# Patient Record
Sex: FEMALE | Race: OTHER | HISPANIC OR LATINO
[De-identification: names, ages, dates, MRNs, and addresses within clinical notes are randomized per-mention and may not be internally consistent; named-entity substitution may affect disease eponyms.]

---

## 2021-01-20 ENCOUNTER — APPOINTMENT (OUTPATIENT)
Dept: ANTEPARTUM | Facility: CLINIC | Age: 21
End: 2021-01-20
Payer: MEDICAID

## 2021-01-20 ENCOUNTER — TRANSCRIPTION ENCOUNTER (OUTPATIENT)
Age: 21
End: 2021-01-20

## 2021-01-20 ENCOUNTER — ASOB RESULT (OUTPATIENT)
Age: 21
End: 2021-01-20

## 2021-01-20 ENCOUNTER — INPATIENT (INPATIENT)
Facility: HOSPITAL | Age: 21
LOS: 2 days | Discharge: HOME | End: 2021-01-23
Attending: OBSTETRICS & GYNECOLOGY | Admitting: OBSTETRICS & GYNECOLOGY
Payer: MEDICAID

## 2021-01-20 VITALS
TEMPERATURE: 99 F | RESPIRATION RATE: 14 BRPM | SYSTOLIC BLOOD PRESSURE: 117 MMHG | HEART RATE: 106 BPM | DIASTOLIC BLOOD PRESSURE: 76 MMHG

## 2021-01-20 DIAGNOSIS — Z3A.39 39 WEEKS GESTATION OF PREGNANCY: ICD-10-CM

## 2021-01-20 PROBLEM — Z00.00 ENCOUNTER FOR PREVENTIVE HEALTH EXAMINATION: Status: ACTIVE | Noted: 2021-01-20

## 2021-01-20 LAB
AMPHET UR-MCNC: NEGATIVE — SIGNIFICANT CHANGE UP
APPEARANCE UR: CLEAR — SIGNIFICANT CHANGE UP
BARBITURATES UR SCN-MCNC: NEGATIVE — SIGNIFICANT CHANGE UP
BASOPHILS # BLD AUTO: 0.05 K/UL — SIGNIFICANT CHANGE UP (ref 0–0.2)
BASOPHILS NFR BLD AUTO: 0.6 % — SIGNIFICANT CHANGE UP (ref 0–2)
BENZODIAZ UR-MCNC: NEGATIVE — SIGNIFICANT CHANGE UP
BILIRUB UR-MCNC: NEGATIVE — SIGNIFICANT CHANGE UP
BLD GP AB SCN SERPL QL: SIGNIFICANT CHANGE UP
COCAINE METAB.OTHER UR-MCNC: NEGATIVE — SIGNIFICANT CHANGE UP
COLOR SPEC: YELLOW — SIGNIFICANT CHANGE UP
DIFF PNL FLD: NEGATIVE — SIGNIFICANT CHANGE UP
EOSINOPHIL # BLD AUTO: 0.1 K/UL — SIGNIFICANT CHANGE UP (ref 0–0.5)
EOSINOPHIL NFR BLD AUTO: 1.2 % — SIGNIFICANT CHANGE UP (ref 0–6)
EPI CELLS # UR: SIGNIFICANT CHANGE UP
GLUCOSE UR QL: NEGATIVE MG/DL — SIGNIFICANT CHANGE UP
HCT VFR BLD CALC: 33.8 % — LOW (ref 34.5–45)
HGB BLD-MCNC: 11.4 G/DL — LOW (ref 11.5–15.5)
IMM GRANULOCYTES NFR BLD AUTO: 0.3 % — SIGNIFICANT CHANGE UP (ref 0–1.5)
KETONES UR-MCNC: NEGATIVE — SIGNIFICANT CHANGE UP
LEUKOCYTE ESTERASE UR-ACNC: ABNORMAL
LYMPHOCYTES # BLD AUTO: 2 K/UL — SIGNIFICANT CHANGE UP (ref 1–3.3)
LYMPHOCYTES # BLD AUTO: 23.3 % — SIGNIFICANT CHANGE UP (ref 13–44)
MCHC RBC-ENTMCNC: 28.9 PG — SIGNIFICANT CHANGE UP (ref 27–34)
MCHC RBC-ENTMCNC: 33.7 GM/DL — SIGNIFICANT CHANGE UP (ref 32–36)
MCV RBC AUTO: 85.8 FL — SIGNIFICANT CHANGE UP (ref 80–100)
METHADONE UR-MCNC: NEGATIVE — SIGNIFICANT CHANGE UP
MONOCYTES # BLD AUTO: 0.61 K/UL — SIGNIFICANT CHANGE UP (ref 0–0.9)
MONOCYTES NFR BLD AUTO: 7.1 % — SIGNIFICANT CHANGE UP (ref 2–14)
NEUTROPHILS # BLD AUTO: 5.8 K/UL — SIGNIFICANT CHANGE UP (ref 1.8–7.4)
NEUTROPHILS NFR BLD AUTO: 67.5 % — SIGNIFICANT CHANGE UP (ref 43–77)
NITRITE UR-MCNC: NEGATIVE — SIGNIFICANT CHANGE UP
OPIATES UR-MCNC: NEGATIVE — SIGNIFICANT CHANGE UP
PCP SPEC-MCNC: SIGNIFICANT CHANGE UP
PCP UR-MCNC: NEGATIVE — SIGNIFICANT CHANGE UP
PH UR: 7 — SIGNIFICANT CHANGE UP (ref 5–8)
PLATELET # BLD AUTO: 337 K/UL — SIGNIFICANT CHANGE UP (ref 150–400)
PROT UR-MCNC: NEGATIVE MG/DL — SIGNIFICANT CHANGE UP
RBC # BLD: 3.94 M/UL — SIGNIFICANT CHANGE UP (ref 3.8–5.2)
RBC # FLD: 14.7 % — HIGH (ref 10.3–14.5)
RBC CASTS # UR COMP ASSIST: SIGNIFICANT CHANGE UP /HPF (ref 0–4)
SP GR SPEC: 1 — LOW (ref 1.01–1.02)
THC UR QL: NEGATIVE — SIGNIFICANT CHANGE UP
UROBILINOGEN FLD QL: NEGATIVE MG/DL — SIGNIFICANT CHANGE UP
WBC # BLD: 8.59 K/UL — SIGNIFICANT CHANGE UP (ref 3.8–10.5)
WBC # FLD AUTO: 8.59 K/UL — SIGNIFICANT CHANGE UP (ref 3.8–10.5)
WBC UR QL: SIGNIFICANT CHANGE UP

## 2021-01-20 PROCEDURE — 76816 OB US FOLLOW-UP PER FETUS: CPT

## 2021-01-20 PROCEDURE — 76819 FETAL BIOPHYS PROFIL W/O NST: CPT

## 2021-01-20 RX ORDER — CITRIC ACID/SODIUM CITRATE 300-500 MG
30 SOLUTION, ORAL ORAL ONCE
Refills: 0 | Status: COMPLETED | OUTPATIENT
Start: 2021-01-20 | End: 2021-01-21

## 2021-01-20 RX ORDER — SODIUM CHLORIDE 9 MG/ML
1000 INJECTION, SOLUTION INTRAVENOUS
Refills: 0 | Status: DISCONTINUED | OUTPATIENT
Start: 2021-01-20 | End: 2021-01-21

## 2021-01-20 RX ORDER — OXYTOCIN 10 UNIT/ML
333.33 VIAL (ML) INJECTION
Qty: 20 | Refills: 0 | Status: DISCONTINUED | OUTPATIENT
Start: 2021-01-20 | End: 2021-01-23

## 2021-01-20 RX ADMIN — SODIUM CHLORIDE 125 MILLILITER(S): 9 INJECTION, SOLUTION INTRAVENOUS at 20:23

## 2021-01-20 NOTE — OB PROVIDER H&P - ASSESSMENT
Brook is a 21yo  at 39 5/6 weeks gestation admitted for induction of labor 2/2 oligohydramnios    - Admit to L&D  - Admission labs sent  - Consent at bedside  - NST reactive  - GBS negative, no need for abx  - Begin induction with cytotec    Discussed with Dr. Nelson

## 2021-01-20 NOTE — OB PROVIDER H&P - HISTORY OF PRESENT ILLNESS
Patient is a 19yo  at 39 5/7 weeks gestation sent from [facility] for induction of labor due oligohydramnios with ADELE 1.9    Patient states that she has had fragmented care during this pregnancy due to an unstable housing arrangement. She recently moved to the area and was seen at [facility] today for her first visit. She had a sono performed by [facility] which showed oligohydramnios with an ADELE of 1.9. She reports good fetal movement. She denies vaginal bleeding, leakage of fluid, and contractions.    This pregnancy has been complicated by:  1. Late and scant prenatal care  2. Oligohydramnios, ADELE 1.9    OBHx: denies  PMH: denies  PSH: denies  Meds: PNV  All: NKDA  Social: Neg x3

## 2021-01-20 NOTE — OB RN PATIENT PROFILE - NS_SOCIALWORKCONSULTREASON_OBGYN_ALL_OB_FT
lack of support. Moved to [place] 2 weeks ago from [place], moved in [facility] in November. Ot moved from [place] because "My mother in law kicked me out of my house. Now I live with my Aunt in [place]"

## 2021-01-20 NOTE — OB PROVIDER H&P - NSHPPHYSICALEXAM_GEN_ALL_CORE
Vital Signs Last 24 Hrs  HR: 106 (20 Jan 2021 19:34) (106 - 106)  BP: 117/76 (20 Jan 2021 19:34) (117/76 - 117/76)    FHT: baseline 130, moderate variability, + accels, - decels  [place]: irregular contractions  Sono: cephalic, posterior placenta  SVE: .5/20/-3    Gen: NAD, AOx3  CV: mildly tachycardic, regular rhythm  Pulm: CTAB  Abd: soft, gravid, nontender, nondistended  Ext: no LE edema or erythema noted

## 2021-01-21 ENCOUNTER — TRANSCRIPTION ENCOUNTER (OUTPATIENT)
Age: 21
End: 2021-01-21

## 2021-01-21 LAB
SARS-COV-2 IGG SERPL QL IA: POSITIVE
SARS-COV-2 IGM SERPL IA-ACNC: 4.37 INDEX — HIGH
SARS-COV-2 RNA SPEC QL NAA+PROBE: SIGNIFICANT CHANGE UP
T PALLIDUM AB TITR SER: NEGATIVE — SIGNIFICANT CHANGE UP

## 2021-01-21 RX ORDER — OXYCODONE HYDROCHLORIDE 5 MG/1
5 TABLET ORAL
Refills: 0 | Status: DISCONTINUED | OUTPATIENT
Start: 2021-01-21 | End: 2021-01-23

## 2021-01-21 RX ORDER — BUTORPHANOL TARTRATE 2 MG/ML
1 INJECTION, SOLUTION INTRAMUSCULAR; INTRAVENOUS ONCE
Refills: 0 | Status: DISCONTINUED | OUTPATIENT
Start: 2021-01-21 | End: 2021-01-21

## 2021-01-21 RX ORDER — SODIUM CHLORIDE 9 MG/ML
1000 INJECTION, SOLUTION INTRAVENOUS ONCE
Refills: 0 | Status: COMPLETED | OUTPATIENT
Start: 2021-01-21 | End: 2021-01-21

## 2021-01-21 RX ORDER — KETOROLAC TROMETHAMINE 30 MG/ML
30 SYRINGE (ML) INJECTION EVERY 6 HOURS
Refills: 0 | Status: DISCONTINUED | OUTPATIENT
Start: 2021-01-21 | End: 2021-01-22

## 2021-01-21 RX ORDER — NALOXONE HYDROCHLORIDE 4 MG/.1ML
0.1 SPRAY NASAL
Refills: 0 | Status: DISCONTINUED | OUTPATIENT
Start: 2021-01-21 | End: 2021-01-23

## 2021-01-21 RX ORDER — CEFAZOLIN SODIUM 1 G
2000 VIAL (EA) INJECTION ONCE
Refills: 0 | Status: COMPLETED | OUTPATIENT
Start: 2021-01-21 | End: 2021-01-21

## 2021-01-21 RX ORDER — ENOXAPARIN SODIUM 100 MG/ML
40 INJECTION SUBCUTANEOUS EVERY 24 HOURS
Refills: 0 | Status: DISCONTINUED | OUTPATIENT
Start: 2021-01-22 | End: 2021-01-23

## 2021-01-21 RX ORDER — LANOLIN
1 OINTMENT (GRAM) TOPICAL EVERY 6 HOURS
Refills: 0 | Status: DISCONTINUED | OUTPATIENT
Start: 2021-01-21 | End: 2021-01-23

## 2021-01-21 RX ORDER — DIPHENHYDRAMINE HCL 50 MG
25 CAPSULE ORAL EVERY 4 HOURS
Refills: 0 | Status: DISCONTINUED | OUTPATIENT
Start: 2021-01-21 | End: 2021-01-23

## 2021-01-21 RX ORDER — DIPHENHYDRAMINE HCL 50 MG
25 CAPSULE ORAL EVERY 6 HOURS
Refills: 0 | Status: DISCONTINUED | OUTPATIENT
Start: 2021-01-21 | End: 2021-01-23

## 2021-01-21 RX ORDER — ONDANSETRON 8 MG/1
4 TABLET, FILM COATED ORAL EVERY 4 HOURS
Refills: 0 | Status: DISCONTINUED | OUTPATIENT
Start: 2021-01-21 | End: 2021-01-23

## 2021-01-21 RX ORDER — AZITHROMYCIN 500 MG/1
500 TABLET, FILM COATED ORAL ONCE
Refills: 0 | Status: COMPLETED | OUTPATIENT
Start: 2021-01-21 | End: 2021-01-21

## 2021-01-21 RX ORDER — MAGNESIUM HYDROXIDE 400 MG/1
30 TABLET, CHEWABLE ORAL
Refills: 0 | Status: DISCONTINUED | OUTPATIENT
Start: 2021-01-21 | End: 2021-01-23

## 2021-01-21 RX ORDER — DEXAMETHASONE 0.5 MG/5ML
4 ELIXIR ORAL EVERY 6 HOURS
Refills: 0 | Status: DISCONTINUED | OUTPATIENT
Start: 2021-01-21 | End: 2021-01-23

## 2021-01-21 RX ORDER — NALBUPHINE HYDROCHLORIDE 10 MG/ML
2.5 INJECTION, SOLUTION INTRAMUSCULAR; INTRAVENOUS; SUBCUTANEOUS EVERY 4 HOURS
Refills: 0 | Status: DISCONTINUED | OUTPATIENT
Start: 2021-01-21 | End: 2021-01-23

## 2021-01-21 RX ORDER — OXYTOCIN 10 UNIT/ML
333.33 VIAL (ML) INJECTION
Qty: 20 | Refills: 0 | Status: DISCONTINUED | OUTPATIENT
Start: 2021-01-21 | End: 2021-01-23

## 2021-01-21 RX ORDER — SIMETHICONE 80 MG/1
80 TABLET, CHEWABLE ORAL EVERY 4 HOURS
Refills: 0 | Status: DISCONTINUED | OUTPATIENT
Start: 2021-01-21 | End: 2021-01-23

## 2021-01-21 RX ORDER — IBUPROFEN 200 MG
600 TABLET ORAL EVERY 6 HOURS
Refills: 0 | Status: COMPLETED | OUTPATIENT
Start: 2021-01-21 | End: 2021-12-20

## 2021-01-21 RX ORDER — FAMOTIDINE 10 MG/ML
20 INJECTION INTRAVENOUS ONCE
Refills: 0 | Status: COMPLETED | OUTPATIENT
Start: 2021-01-21 | End: 2021-01-21

## 2021-01-21 RX ORDER — OXYCODONE HYDROCHLORIDE 5 MG/1
5 TABLET ORAL ONCE
Refills: 0 | Status: DISCONTINUED | OUTPATIENT
Start: 2021-01-21 | End: 2021-01-23

## 2021-01-21 RX ORDER — OXYTOCIN 10 UNIT/ML
2 VIAL (ML) INJECTION
Qty: 30 | Refills: 0 | Status: DISCONTINUED | OUTPATIENT
Start: 2021-01-21 | End: 2021-01-23

## 2021-01-21 RX ORDER — ACETAMINOPHEN 500 MG
975 TABLET ORAL
Refills: 0 | Status: DISCONTINUED | OUTPATIENT
Start: 2021-01-21 | End: 2021-01-23

## 2021-01-21 RX ORDER — SODIUM CHLORIDE 9 MG/ML
1000 INJECTION, SOLUTION INTRAVENOUS
Refills: 0 | Status: DISCONTINUED | OUTPATIENT
Start: 2021-01-21 | End: 2021-01-23

## 2021-01-21 RX ORDER — TETANUS TOXOID, REDUCED DIPHTHERIA TOXOID AND ACELLULAR PERTUSSIS VACCINE, ADSORBED 5; 2.5; 8; 8; 2.5 [IU]/.5ML; [IU]/.5ML; UG/.5ML; UG/.5ML; UG/.5ML
0.5 SUSPENSION INTRAMUSCULAR ONCE
Refills: 0 | Status: DISCONTINUED | OUTPATIENT
Start: 2021-01-21 | End: 2021-01-23

## 2021-01-21 RX ORDER — ACETAMINOPHEN 500 MG
1000 TABLET ORAL ONCE
Refills: 0 | Status: DISCONTINUED | OUTPATIENT
Start: 2021-01-21 | End: 2021-01-23

## 2021-01-21 RX ADMIN — Medication 100 MILLIGRAM(S): at 17:11

## 2021-01-21 RX ADMIN — FAMOTIDINE 20 MILLIGRAM(S): 10 INJECTION INTRAVENOUS at 16:40

## 2021-01-21 RX ADMIN — Medication 30 MILLILITER(S): at 16:34

## 2021-01-21 RX ADMIN — SODIUM CHLORIDE 125 MILLILITER(S): 9 INJECTION, SOLUTION INTRAVENOUS at 23:20

## 2021-01-21 RX ADMIN — AZITHROMYCIN 255 MILLIGRAM(S): 500 TABLET, FILM COATED ORAL at 17:02

## 2021-01-21 RX ADMIN — SODIUM CHLORIDE 1000 MILLILITER(S): 9 INJECTION, SOLUTION INTRAVENOUS at 14:11

## 2021-01-21 RX ADMIN — Medication 30 MILLIGRAM(S): at 18:42

## 2021-01-21 RX ADMIN — Medication 1000 MILLIUNIT(S)/MIN: at 18:32

## 2021-01-21 RX ADMIN — SODIUM CHLORIDE 125 MILLILITER(S): 9 INJECTION, SOLUTION INTRAVENOUS at 05:20

## 2021-01-21 RX ADMIN — SODIUM CHLORIDE 125 MILLILITER(S): 9 INJECTION, SOLUTION INTRAVENOUS at 12:18

## 2021-01-21 RX ADMIN — Medication 30 MILLIGRAM(S): at 23:20

## 2021-01-21 NOTE — OB PROVIDER LABOR PROGRESS NOTE - ASSESSMENT
19yo  at 39.6 weeks GA here for an induction of labor 2/2 oligohydramnios.   -VSS  -Cat 1 tracing  -Ruthy regularly  -Cook balloon removed   -Continue with cytotec PO

## 2021-01-21 NOTE — DISCHARGE NOTE OB - NS AS DC STROKE DX YN
no
53 yo F, hx of depression and anxiety, in TSI housing, PMHx significant for breast Ca treated with chemotherapy, self referred to ED c/o transient VH of seeing  in her apartment when she woke up this AM .  Currently does not feel  is there and believes it might have been her mind playing tricks on her.  She is somewhat oddly related but is cooperative, makes good eye contact, denies any psychotic symptoms and denies any SI/HI/I/P.  She is not interested in inpatient psychiatric hospitalization and does not meet criteria for involuntary hospitalization at this time.  She has a hx of not wanting to f/u with psychiatrists but prefers to see PMD + neurologist.  Provided crisis center information to patient and she agrees to return to ED/call should symptoms worsen.

## 2021-01-21 NOTE — OB PROVIDER LABOR PROGRESS NOTE - NS_OBIHICONTRACTIONPATTERNDETAILS_OBGYN_ALL_OB_FT
q5 minutes
q6 minutes
irregular contractions
ramón irregularly
ramón regularly q 2 minutes
raómn irregularly

## 2021-01-21 NOTE — DISCHARGE NOTE OB - PATIENT PORTAL LINK FT
You can access the FollowMyHealth Patient Portal offered by [facility] by registering at the following website: [email or web address]. By joining [employer]’s FollowMyHealth portal, you will also be able to view your health information using other applications (apps) compatible with our system.

## 2021-01-21 NOTE — OB PROVIDER DELIVERY SUMMARY - NSPROVIDERDELIVERYNOTE_OBGYN_ALL_OB_FT
Brief  Delivery Summary    Procedure: pLTCS for NRFHT remote from delivery  Findings: Viable female infant, apgars 9/9, weight 2790g,  cephalic presentation. Grossly normal uterus, fallopian tubes and ovaries.   Two layer uterine closure  SubQ skin closure  EBL: 638cc  UOP: 350cc  Fluids in OR: 600cc  Complications: Nuchal cord x1

## 2021-01-21 NOTE — OB PROVIDER LABOR PROGRESS NOTE - ASSESSMENT
- T cat 1  - Continue cytotec  - Will give IV stadol for pain management  - Will continue to monitor    Discussed with Dr. Mendez - T cat 1  - Continue cytotec  - Will give IV stadol for pain management  - Will continue to monitor    Discussed with Dr. Mednez    I agree with above assessment and plan

## 2021-01-21 NOTE — DISCHARGE NOTE OB - PLAN OF CARE
Rapid recovery Please call your provider in 1 week for wound check. Take medications as directed, regular diet, activity as tolerated. Exclusive breast feeding for the first 6 months is recommended. Nothing per vagina for 6 weeks (incl. sex, douching, etc). If you have additional concerns, please inform your provider.

## 2021-01-21 NOTE — OB PROVIDER LABOR PROGRESS NOTE - ASSESSMENT
20 y.o.  at 40 weeks 1 day gestation undergoing IOL for oligohydramnios, on cytotec. Cat 1 FHT.    - continue cytotec induction  - continuous toco and fetal heart monitoring    Discussed with Dr. Nelson

## 2021-01-21 NOTE — DISCHARGE NOTE OB - CARE PROVIDER_API CALL
[facility],   [address] [place] [address]  Phone: [phone]  Fax: [phone]  Follow Up Time:

## 2021-01-21 NOTE — OB PROVIDER LABOR PROGRESS NOTE - NS_OBIHIFHRDETAILS_OBGYN_ALL_OB_FT
120 baseline, moderate variability, - accels, - decels
130 baseline, moderate variability, + accels, - decels
baseline 130, moderate variability, + accels, - decels
baseline 130s, moderate variability, +accels, -decels

## 2021-01-21 NOTE — DISCHARGE NOTE OB - PROVIDER TOKENS
FREE:[LAST:[[facility]],PHONE:[phone]],FAX:[phone]],ADDRESS:[[address] [place] [address]]]

## 2021-01-21 NOTE — OB NEONATOLOGY/PEDIATRICIAN DELIVERY SUMMARY - NSPEDSNEONOTESA_OBGYN_ALL_OB_FT
Requested by   to attend this  Vaginal delivery due to  of a  y/o G P mom at  weeks GA.  She had + PNC, is positive, HIV negative, HBsAg negative, GBS negative, Rubella Imn, RPR NR.   Maternal history pertinent for .  L&D: ROM at.  Baby born vertex with good tone and cry, transferred to warmer, orally suctioned, dried,  stimulated  Resuscitation: routine.  Apgar score 9/9. Baby showed to father and then transferred to mom for STS.    BW:g  A:  week AGA female  maternal l . EOS score  ..Temperature prior to  transfer    C..  P: Transition to Regular Nursery under PMD/ Hospitalist care.      Admit to NBN        Baby left in stable condition with the parents and L&D staff Requested by Dr. Mendez to attend this  P C/S delivery due to Category II tracing of a 19 y/o G1 P 0mom at 40.1 weeks GA.  She had + PNC ? [place] then [place] 1day prior to admission with oligo is AB positive, HIV negative, HBsAg negative, GBS negative, Rubella Imn, RPR NR.   Maternal history pertinent for Oligo, negative COVID .  L&D: ROM at15:20 clear fluids  Baby born vertex with one cord around the neck  good tone and cry, transferred to warmer, orally suctioned, dried,  stimulated  Resuscitation: routine.  Apgar score 9/9. Baby showed to father and then transferred to mom for STS.    BW: 2790g  A: 40.1 week AGA female  maternal  covid negative . EOS score  ..Temperature prior to  transfer    C..  P: Transition to Regular Nursery under PMD/ Hospitalist care.      Admit to NBN        Baby left in stable condition with the parents and L&D staff

## 2021-01-21 NOTE — OB PROVIDER LABOR PROGRESS NOTE - ASSESSMENT
21yo  at 39.6 weeks GA here for an induction of labor 2/2 oligohydramnios.   -VSS  -Cat 1 tracing  -Ruthy irregularly  -SROM@1520, clear   -IUPC placed, starting amnioinfusion     Discussed with Dr. Mendez.

## 2021-01-21 NOTE — OB PROVIDER LABOR PROGRESS NOTE - ASSESSMENT
20 y.o.  at 40 weeks 1 day undergoing IOL for oligohydramnios, on cytotec. Cat 1 FHT.    - continue cytotec induction  - continuous toco and fetal heart monitoring    Discussed with Dr. Nelson

## 2021-01-21 NOTE — CHART NOTE - NSCHARTNOTEFT_GEN_A_CORE
19yo  at 40.1 weeks GA here for an induction of labor in the setting of oligohydramnios. Patient with intermittent category 2 tracing after repositioning, amnioinfusion and oxygen. Risks and benefits of primary  section at this time discussed with the patient given non-reassuring fetal heart tracing remote from delivery and SVE . Patient verbalized understanding and agreement with plan.     Discussed with Dr. Mendez.

## 2021-01-21 NOTE — DISCHARGE NOTE OB - MEDICATION SUMMARY - MEDICATIONS TO TAKE
I will START or STAY ON the medications listed below when I get home from the hospital:    acetaminophen 325 mg oral tablet  -- 3 tab(s) by mouth every 6 hours, As Needed -for moderate pain MDD:4,000 mg   -- Indication: For moderate pain    ibuprofen 600 mg oral tablet  -- 1 tab(s) by mouth every 6 hours, As Needed -for moderate pain  -   -- Indication: For moderate pain

## 2021-01-21 NOTE — DISCHARGE NOTE OB - CARE PLAN
Principal Discharge DX:	 delivery delivered  Goal:	Rapid recovery  Assessment and plan of treatment:	Please call your provider in 1 week for wound check. Take medications as directed, regular diet, activity as tolerated. Exclusive breast feeding for the first 6 months is recommended. Nothing per vagina for 6 weeks (incl. sex, douching, etc). If you have additional concerns, please inform your provider.

## 2021-01-21 NOTE — OB PROVIDER LABOR PROGRESS NOTE - NS_SUBJECTIVE/OBJECTIVE_OBGYN_ALL_OB_FT
Patient is resting in her room.    Vital Signs Last 24 Hrs  T(C): 36.8 (21 Jan 2021 02:04), Max: 37.0 (20 Jan 2021 19:34)  T(F): 98.24 (21 Jan 2021 02:04), Max: 98.6 (20 Jan 2021 19:34)  HR: 75 (21 Jan 2021 02:05) (75 - 106)  BP: 105/67 (21 Jan 2021 02:05) (105/67 - 117/76)  RR: 17 (21 Jan 2021 02:04) (14 - 17)
Patient resting in room.    Vital Signs Last 24 Hrs  T(C): 37 (20 Jan 2021 20:39), Max: 37.0 (20 Jan 2021 19:34)  T(F): 98.6 (20 Jan 2021 20:39), Max: 98.6 (20 Jan 2021 19:34)  HR: 106 (20 Jan 2021 20:39) (106 - 106)  BP: 117/76 (20 Jan 2021 20:39) (117/76 - 117/76)  RR: 14 (20 Jan 2021 20:39) (14 - 14)
Patient reports painful contractions. She is requesting IV pain medication. Risks and benefits of IV Stadol and epidural for pain management reviewed. Patient desires IV pain medication at this time
Patient seen and examined at bedside. She is doing well. No complaints at this time.   Vital Signs Last 24 Hrs  T(C): 36.7 (21 Jan 2021 07:14), Max: 37.0 (20 Jan 2021 19:34)  T(F): 98.06 (21 Jan 2021 07:14), Max: 98.6 (20 Jan 2021 19:34)  HR: 62 (21 Jan 2021 07:34) (62 - 106)  BP: 107/68 (21 Jan 2021 07:34) (105/67 - 117/76)
Patient seen and examined at bedside. She is doing well. No complaints at this time.   Vital Signs Last 24 Hrs  T(C): 36.9 (21 Jan 2021 06:02), Max: 37.0 (20 Jan 2021 19:34)  T(F): 98.42 (21 Jan 2021 06:02), Max: 98.6 (20 Jan 2021 19:34)  HR: 62 (21 Jan 2021 05:22) (62 - 106)  BP: 106/71 (21 Jan 2021 05:22) (105/67 - 117/76)  RR: 16 (21 Jan 2021 06:02) (14 - 17)
Patient seen and examined at bedside. She is doing well. No complaints at this time.   Vital Signs Last 24 Hrs  T(C): 36.9 (21 Jan 2021 12:09), Max: 37.0 (20 Jan 2021 19:34)  T(F): 98.42 (21 Jan 2021 12:09), Max: 98.6 (20 Jan 2021 19:34)  HR: 59 (21 Jan 2021 15:49) (59 - 106)  BP: 120/75 (21 Jan 2021 15:49) (102/56 - 130/83)  RR: 16 (21 Jan 2021 12:09) (12 - 17)  SpO2: 100% (21 Jan 2021 15:09) (99% - 100%)

## 2021-01-21 NOTE — CHART NOTE - NSCHARTNOTEFT_GEN_A_CORE
Provider called back to room by patient. Patient reports changing her mind. SHe would like an epidural instead of IV pain medication at this time. Stadol discontinued. CRNA informed. IV bolus begun.    Discussed with Dr. Mendez

## 2021-01-21 NOTE — OB RN DELIVERY SUMMARY - NS_SEPSISRSKCALC_OBGYN_ALL_OB_FT
EOS calculated successfully. EOS Risk Factor: 0.5/1000 live births ([facility] [place] national incidence); GA=40w1d; Temp=98.6; ROM=2.017; GBS='Negative'; Antibiotics='No antibiotics or any antibiotics < 2 hrs prior to birth'

## 2021-01-21 NOTE — DISCHARGE NOTE OB - HOSPITAL COURSE
She is now a  who presented for in labor and was sent to the OR for a primary  section due to NRFHT and remote from delivery. She had an uncomplicated surgery and postpartum course. Upon discharge she was passing gas, tolerating PO, and ambulating.

## 2021-01-21 NOTE — OB PROVIDER LABOR PROGRESS NOTE - ASSESSMENT
19yo  at 39.6 weeks GA here for an induction of labor 2/2 oligohydramnios.   -VSS  -Cat 1 tracing  -Ruthy irregularly  -SSE done, no gross pooling, nitrazine negative, amnisure negative  -Cook balloon placed (80/60)  -Continue with cytotec PO     Discussed with Dr. Mendez.  21yo  at 39.6 weeks GA here for an induction of labor 2/2 oligohydramnios.   -VSS  -Cat 1 tracing  -Ruthy irregularly  -SSE done, no gross pooling, nitrazine negative, amnisure negative  -Cook balloon placed (/60)  -Continue with cytotec PO     Discussed with Dr. Mendez.       I agree with above assessment and plan

## 2021-01-22 LAB
BASOPHILS # BLD AUTO: 0.03 K/UL — SIGNIFICANT CHANGE UP (ref 0–0.2)
BASOPHILS NFR BLD AUTO: 0.2 % — SIGNIFICANT CHANGE UP (ref 0–2)
EOSINOPHIL # BLD AUTO: 0.01 K/UL — SIGNIFICANT CHANGE UP (ref 0–0.5)
EOSINOPHIL NFR BLD AUTO: 0.1 % — SIGNIFICANT CHANGE UP (ref 0–6)
HCT VFR BLD CALC: 27.5 % — LOW (ref 34.5–45)
HGB BLD-MCNC: 9.1 G/DL — LOW (ref 11.5–15.5)
IMM GRANULOCYTES NFR BLD AUTO: 0.7 % — SIGNIFICANT CHANGE UP (ref 0–1.5)
LYMPHOCYTES # BLD AUTO: 13.3 % — SIGNIFICANT CHANGE UP (ref 13–44)
LYMPHOCYTES # BLD AUTO: 2.03 K/UL — SIGNIFICANT CHANGE UP (ref 1–3.3)
MCHC RBC-ENTMCNC: 28.7 PG — SIGNIFICANT CHANGE UP (ref 27–34)
MCHC RBC-ENTMCNC: 33.1 GM/DL — SIGNIFICANT CHANGE UP (ref 32–36)
MCV RBC AUTO: 86.8 FL — SIGNIFICANT CHANGE UP (ref 80–100)
MONOCYTES # BLD AUTO: 0.64 K/UL — SIGNIFICANT CHANGE UP (ref 0–0.9)
MONOCYTES NFR BLD AUTO: 4.2 % — SIGNIFICANT CHANGE UP (ref 2–14)
NEUTROPHILS # BLD AUTO: 12.47 K/UL — HIGH (ref 1.8–7.4)
NEUTROPHILS NFR BLD AUTO: 81.5 % — HIGH (ref 43–77)
PLATELET # BLD AUTO: 269 K/UL — SIGNIFICANT CHANGE UP (ref 150–400)
RBC # BLD: 3.17 M/UL — LOW (ref 3.8–5.2)
RBC # FLD: 14.6 % — HIGH (ref 10.3–14.5)
WBC # BLD: 15.29 K/UL — HIGH (ref 3.8–10.5)
WBC # FLD AUTO: 15.29 K/UL — HIGH (ref 3.8–10.5)

## 2021-01-22 RX ORDER — SODIUM CHLORIDE 9 MG/ML
500 INJECTION, SOLUTION INTRAVENOUS ONCE
Refills: 0 | Status: COMPLETED | OUTPATIENT
Start: 2021-01-22 | End: 2021-01-22

## 2021-01-22 RX ORDER — IBUPROFEN 200 MG
600 TABLET ORAL EVERY 6 HOURS
Refills: 0 | Status: DISCONTINUED | OUTPATIENT
Start: 2021-01-22 | End: 2021-01-23

## 2021-01-22 RX ADMIN — Medication 975 MILLIGRAM(S): at 21:57

## 2021-01-22 RX ADMIN — Medication 30 MILLIGRAM(S): at 05:32

## 2021-01-22 RX ADMIN — Medication 30 MILLIGRAM(S): at 11:42

## 2021-01-22 RX ADMIN — SODIUM CHLORIDE 1000 MILLILITER(S): 9 INJECTION, SOLUTION INTRAVENOUS at 02:16

## 2021-01-22 RX ADMIN — Medication 975 MILLIGRAM(S): at 14:45

## 2021-01-22 RX ADMIN — Medication 975 MILLIGRAM(S): at 08:36

## 2021-01-22 RX ADMIN — ENOXAPARIN SODIUM 40 MILLIGRAM(S): 100 INJECTION SUBCUTANEOUS at 05:32

## 2021-01-22 RX ADMIN — Medication 600 MILLIGRAM(S): at 17:21

## 2021-01-22 NOTE — PROGRESS NOTE ADULT - SUBJECTIVE AND OBJECTIVE BOX
IVAN MONTANO is a 20y  now POD#1 s/p  section secondary to intermittent category 2 tracing remote from delivery at 39 weeks gestation, uncomplicated.     S:    The patient has no complaints.   Pain is controlled with current treatment regimen.   She is ambulating without difficulty and tolerating a clear diet.   -flatus/-BM/-voiding   She endorses appropriate lochia, which is decreasing.   She is breastfeeding without difficulty.   Patient denies lightheadedness, dizziness, palpitations, shortness of breath and chest pain.     O:    T(C): 36.7 (21 @ 05:27), Max: 36.9 (21 @ 12:09)  HR: 85 (21 @ 05:27) (57 - 85)  BP: 103/57 (21 @ 05:27)   RR: 16 (21 @ 05:27) (12 - 20)  SpO2: 97% (21 @ 05:27) (96% - 100%)    Gen: NAD, AOx3  CV: RRR  Pulm: CTAB  Breast: Nontender, not engorged   Abdomen:  Soft, non-tender, non-distended, +bowel sounds.  Uterus:  Fundus firm below umbilicus  Incision:  Clean/dry/intact with steri-strips in place  VE:  +Lochia  Ext:  Non-tender, non-edematous                           11.4   8.59  )-----------( 337      ( 2021 20:41 )             33.8

## 2021-01-23 VITALS
DIASTOLIC BLOOD PRESSURE: 67 MMHG | SYSTOLIC BLOOD PRESSURE: 101 MMHG | TEMPERATURE: 98 F | HEART RATE: 72 BPM | RESPIRATION RATE: 16 BRPM | OXYGEN SATURATION: 98 %

## 2021-01-23 PROCEDURE — 86900 BLOOD TYPING SEROLOGIC ABO: CPT

## 2021-01-23 PROCEDURE — 59025 FETAL NON-STRESS TEST: CPT

## 2021-01-23 PROCEDURE — U0003: CPT

## 2021-01-23 PROCEDURE — G0463: CPT

## 2021-01-23 PROCEDURE — 36415 COLL VENOUS BLD VENIPUNCTURE: CPT

## 2021-01-23 PROCEDURE — 86901 BLOOD TYPING SEROLOGIC RH(D): CPT

## 2021-01-23 PROCEDURE — 59050 FETAL MONITOR W/REPORT: CPT

## 2021-01-23 PROCEDURE — T1013: CPT

## 2021-01-23 PROCEDURE — 81001 URINALYSIS AUTO W/SCOPE: CPT

## 2021-01-23 PROCEDURE — 86780 TREPONEMA PALLIDUM: CPT

## 2021-01-23 PROCEDURE — 85025 COMPLETE CBC W/AUTO DIFF WBC: CPT

## 2021-01-23 PROCEDURE — U0005: CPT

## 2021-01-23 PROCEDURE — 80307 DRUG TEST PRSMV CHEM ANLYZR: CPT

## 2021-01-23 PROCEDURE — 86850 RBC ANTIBODY SCREEN: CPT

## 2021-01-23 PROCEDURE — 86769 SARS-COV-2 COVID-19 ANTIBODY: CPT

## 2021-01-23 RX ORDER — ACETAMINOPHEN 500 MG
3 TABLET ORAL
Qty: 120 | Refills: 0
Start: 2021-01-23 | End: 2021-02-01

## 2021-01-23 RX ORDER — IBUPROFEN 200 MG
1 TABLET ORAL
Qty: 40 | Refills: 0
Start: 2021-01-23 | End: 2021-02-01

## 2021-01-23 RX ADMIN — Medication 975 MILLIGRAM(S): at 09:49

## 2021-01-23 RX ADMIN — Medication 975 MILLIGRAM(S): at 03:09

## 2021-01-23 RX ADMIN — ENOXAPARIN SODIUM 40 MILLIGRAM(S): 100 INJECTION SUBCUTANEOUS at 05:31

## 2021-01-23 RX ADMIN — Medication 600 MILLIGRAM(S): at 00:15

## 2021-01-23 RX ADMIN — Medication 600 MILLIGRAM(S): at 05:31

## 2021-01-23 NOTE — PROGRESS NOTE ADULT - ASSESSMENT
A/P: Patient is a 20y  s/p pCS now POD2  - Stable, doing well postpartum  - Hgb 11.4 -> 9.1  - Pain: well controlled on PO pain meds  - GI: regular diet  - : voiding  - DVT ppx: lovenox  - Continue routine postop care  - Dispo: Home today pending attending approval
A/P: 20y  now POD#1 s/p  section secondary to intermittent category 2 tracing remote from delivery at 39 weeks gestation, uncomplicated.     -Vital Signs Stable  -Pending TOV, tolerating a clear diet, bowel sounds present  -Heme: Hgb 11.4 --> AM labs pending   -Advance care as tolerated   -Continue routine postpartum/postoperative care and education  -Healthy female infant  -Dispo: Patient to be discharged when meeting all postpartum/postoperative milestones and pending attending approval.

## 2021-01-23 NOTE — PROGRESS NOTE ADULT - ATTENDING COMMENTS
post  day # 2  no complaints  exam wnl  labs reviewed    cleared for dc  discussed contraception, vaccination, breastfeeding  follow up for wound check post partum visit
 s/p P c sec   no complaints  breast feeding  abd soft, ND, NT, steristrip in place, wound healthy  ext  no edema  breast soft  plan   24 h dc  DVT ppx  wound care discussed

## 2021-01-23 NOTE — PROGRESS NOTE ADULT - SUBJECTIVE AND OBJECTIVE BOX
IVAN MONTANO is a 20y  s/p pCS now POD2 of a viable female infant.     SUBJECTIVE:  No acute events overnight, patient has no complaints.  Pain is well controlled with PRN pain medication.  She is ambulating, voiding, tolerating PO. Denies N/V  ** flatus, ** BM.  ** lochia.    OBJECTIVE:  Physical exam:  General: AOx3, NAD.  Heart: RRR  Lungs: CTAB  Abdomen: Soft, appropriately tender to palpitation, firm uterine fundus at umbilicus. Dressing removed,*** Incision is clean dry and intact.  Vaginal: minimal blood on pad, no bleeding on palpation of fundus  Ext: No DVT signs, warm extremities.    Vital Signs Last 24 Hrs  T(C): 36.8 (2021 04:30), Max: 37 (2021 13:21)  T(F): 98.2 (2021 04:30), Max: 98.6 (2021 13:21)  HR: 72 (2021 04:30) (64 - 76)  BP: 101/67 (2021 04:30) (98/79 - 105/65)  RR: 16 (2021 04:30) (16 - 18)  SpO2: 98% (2021 04:30) (98% - 100%)    LABS:                        9.1    15.29 )-----------( 269      ( 2021 08:20 )             27.5

## 2024-06-25 NOTE — OB RN INTRAOPERATIVE NOTE - NS_ROOMIN_OBGYN_ALL_OB_DT
----- Message from SHAUN Duffy sent at 6/24/2024  6:35 PM CDT -----  Normal pap and C/G screen. Repeat in 2027.    21-Jan-2021 16:56

## 2025-05-29 NOTE — OB RN PATIENT PROFILE - POST PARTUM DEPRESSION SCREEN OB 2
----- Message from Félix sent at 5/29/2025 10:33 AM CDT -----  Regarding: Nursing Home  Contact: Pt Daughter [phone]  [email or web address] Patient is calling for Medical Advice regarding: Brittaney Chavez, patients sister, called to request a call from the office to discuss her sister, the patient. She said she is trying to get patient into nursing home. She said that she needs a letter regarding this and she said she needs this asap. Please call patient at number provided to discuss.How long has patient had these symptoms:Pharmacy name and phone#:[facility] back or [facility]: CallComments:Please advise patient replies from provider may take up to 48 hours.   no